# Patient Record
Sex: FEMALE | Race: BLACK OR AFRICAN AMERICAN | NOT HISPANIC OR LATINO | URBAN - METROPOLITAN AREA
[De-identification: names, ages, dates, MRNs, and addresses within clinical notes are randomized per-mention and may not be internally consistent; named-entity substitution may affect disease eponyms.]

---

## 2017-12-01 ENCOUNTER — EMERGENCY (EMERGENCY)
Facility: HOSPITAL | Age: 27
LOS: 1 days | Discharge: HOME | End: 2017-12-01
Attending: EMERGENCY MEDICINE | Admitting: EMERGENCY MEDICINE
Payer: COMMERCIAL

## 2017-12-01 VITALS
RESPIRATION RATE: 18 BRPM | HEART RATE: 109 BPM | OXYGEN SATURATION: 98 % | SYSTOLIC BLOOD PRESSURE: 115 MMHG | TEMPERATURE: 102 F | DIASTOLIC BLOOD PRESSURE: 71 MMHG

## 2017-12-01 VITALS
OXYGEN SATURATION: 98 % | RESPIRATION RATE: 18 BRPM | HEART RATE: 87 BPM | SYSTOLIC BLOOD PRESSURE: 105 MMHG | DIASTOLIC BLOOD PRESSURE: 60 MMHG | TEMPERATURE: 100 F

## 2017-12-01 DIAGNOSIS — J02.9 ACUTE PHARYNGITIS, UNSPECIFIED: ICD-10-CM

## 2017-12-01 DIAGNOSIS — J03.90 ACUTE TONSILLITIS, UNSPECIFIED: ICD-10-CM

## 2017-12-01 LAB — HCG UR QL: NEGATIVE — SIGNIFICANT CHANGE UP

## 2017-12-01 PROCEDURE — 96375 TX/PRO/DX INJ NEW DRUG ADDON: CPT

## 2017-12-01 PROCEDURE — 99284 EMERGENCY DEPT VISIT MOD MDM: CPT | Mod: 25

## 2017-12-01 PROCEDURE — 96374 THER/PROPH/DIAG INJ IV PUSH: CPT

## 2017-12-01 PROCEDURE — 81025 URINE PREGNANCY TEST: CPT

## 2017-12-01 PROCEDURE — 99284 EMERGENCY DEPT VISIT MOD MDM: CPT

## 2017-12-01 RX ORDER — DEXAMETHASONE 0.5 MG/5ML
10 ELIXIR ORAL ONCE
Qty: 0 | Refills: 0 | Status: COMPLETED | OUTPATIENT
Start: 2017-12-01 | End: 2017-12-01

## 2017-12-01 RX ORDER — SODIUM CHLORIDE 9 MG/ML
1000 INJECTION INTRAMUSCULAR; INTRAVENOUS; SUBCUTANEOUS ONCE
Qty: 0 | Refills: 0 | Status: COMPLETED | OUTPATIENT
Start: 2017-12-01 | End: 2017-12-01

## 2017-12-01 RX ORDER — ACETAMINOPHEN 500 MG
650 TABLET ORAL ONCE
Qty: 0 | Refills: 0 | Status: COMPLETED | OUTPATIENT
Start: 2017-12-01 | End: 2017-12-01

## 2017-12-01 RX ORDER — KETOROLAC TROMETHAMINE 30 MG/ML
30 SYRINGE (ML) INJECTION ONCE
Qty: 0 | Refills: 0 | Status: DISCONTINUED | OUTPATIENT
Start: 2017-12-01 | End: 2017-12-01

## 2017-12-01 RX ORDER — AMPICILLIN SODIUM AND SULBACTAM SODIUM 250; 125 MG/ML; MG/ML
3 INJECTION, POWDER, FOR SUSPENSION INTRAMUSCULAR; INTRAVENOUS ONCE
Qty: 0 | Refills: 0 | Status: COMPLETED | OUTPATIENT
Start: 2017-12-01 | End: 2017-12-01

## 2017-12-01 RX ORDER — AMOXICILLIN 250 MG/5ML
1 SUSPENSION, RECONSTITUTED, ORAL (ML) ORAL
Qty: 20 | Refills: 0 | OUTPATIENT
Start: 2017-12-01 | End: 2017-12-11

## 2017-12-01 RX ADMIN — Medication 650 MILLIGRAM(S): at 15:44

## 2017-12-01 RX ADMIN — Medication 10 MILLIGRAM(S): at 17:00

## 2017-12-01 RX ADMIN — Medication 30 MILLIGRAM(S): at 17:00

## 2017-12-01 RX ADMIN — SODIUM CHLORIDE 2000 MILLILITER(S): 9 INJECTION INTRAMUSCULAR; INTRAVENOUS; SUBCUTANEOUS at 16:26

## 2017-12-01 RX ADMIN — AMPICILLIN SODIUM AND SULBACTAM SODIUM 200 GRAM(S): 250; 125 INJECTION, POWDER, FOR SUSPENSION INTRAMUSCULAR; INTRAVENOUS at 16:26

## 2017-12-01 NOTE — ED ADULT NURSE NOTE - OBJECTIVE STATEMENT
states sore throat x3days. denies fever/chills but found to be febrile in triage.  denies chest pain, sob, dizziness.  no n/v/d.  denies body aches. upgraded to MD for fever.

## 2017-12-01 NOTE — ED PROVIDER NOTE - ENMT, MLM
Airway patent, Nasal mucosa clear. Mouth with normal mucosa. Throat has no vesicles, left tonsilar erythematous and edematous, with exudates

## 2017-12-01 NOTE — ED PROVIDER NOTE - ATTENDING CONTRIBUTION TO CARE
Pt w/ no sig PMHx p/w 3 days of L sided throat pain, progressively worsening. Pt reports dysphagia and odynophagia. No fever noted at home. No rhinorrhea. No cough. No SOB. Pt w/ no sig PMHx p/w 3 days of L sided throat pain, progressively worsening. Pt reports dysphagia and odynophagia. No fever noted at home. No rhinorrhea. No cough. No SOB. Pt is sexually active. No  complaints. Pt has IUD and states she does not get regular menses.   VITAL SIGNS: I have reviewed nursing notes and confirm.  CONSTITUTIONAL: Well-developed; well-nourished; in no acute distress. non- toxic appearing  SKIN: warm and dry, no acute rash.  HEAD: Normocephalic; atraumatic.  EYES: conjunctiva and sclera clear.  ENT: No nasal discharge; airway clear. uvula is midline. + L tonsil erythematous, enlarged w/ exudates. no peritonsillar swelling / bulging. no sublingual edema. no drooling or stridor.   NECK: Supple; + tender lymphadenopathy  CARD: S1, S2 normal; no murmurs, gallops, or rubs. Regular rate and rhythm.  RESP: Breaths sounds equal and clear b/l. No increased WOB, tachypnea, hypoxia, or accessory mm use. Pt speaks in full sentences.   EXT: Normal ROM.   LYMPH: + cervical adenopathy.  NEURO: Alert, oriented. Grossly unremarkable.  Pt p/w fever, exudates, adenopathy, c/w pharyngitis/ tonsillitis. IVF, IV Decadron for inflammation. IVF Toradol for pain / fever. Re-eval. Anticipate d/c w/ abx if clinically improved. No evidence of PTA On exam at this time, however will give pt careful return precautions, f/u PCP  PSYCH: Cooperative, appropriate.

## 2017-12-01 NOTE — ED PROVIDER NOTE - MEDICAL DECISION MAKING DETAILS
26 yo female here for throat pain, fever, with findings consistent with tonsilitis, no sign of abscess, no airway compromise, will give unasyn, decadron to reduce swelling, toradol and fluid for fever. 28 yo female here for throat pain, fever, with findings consistent with tonsilitis, no sign of abscess, no airway compromise, Patient given Unasyn, Decracron, IV toradol and IV hydration. Her symptoms improved. She is stable to be discharged home with 10 days of amoxicillin to follow up with primary care physician, which has been set up for her.

## 2017-12-01 NOTE — ED PROVIDER NOTE - OBJECTIVE STATEMENT
28 yo female no PMH who presented with 3 days of throat pain, stated pain started suddenly in AM 3 days prior, worsening, left side on back of her throat, worsened with swallowing, also been spitting up more. Denied chill, no runny nose, no SOB, no chest pain, no dysuria, some nausea without vomiting. No other complaints.

## 2017-12-04 ENCOUNTER — APPOINTMENT (OUTPATIENT)
Dept: INTERNAL MEDICINE | Facility: CLINIC | Age: 27
End: 2017-12-04
Payer: MEDICAID

## 2017-12-04 VITALS
DIASTOLIC BLOOD PRESSURE: 72 MMHG | TEMPERATURE: 98.9 F | HEART RATE: 81 BPM | BODY MASS INDEX: 30.82 KG/M2 | WEIGHT: 185 LBS | SYSTOLIC BLOOD PRESSURE: 109 MMHG | HEIGHT: 65 IN | OXYGEN SATURATION: 98 %

## 2017-12-04 DIAGNOSIS — Z00.00 ENCOUNTER FOR GENERAL ADULT MEDICAL EXAMINATION W/OUT ABNORMAL FINDINGS: ICD-10-CM

## 2017-12-04 DIAGNOSIS — J03.90 ACUTE TONSILLITIS, UNSPECIFIED: ICD-10-CM

## 2017-12-04 DIAGNOSIS — J06.9 ACUTE UPPER RESPIRATORY INFECTION, UNSPECIFIED: ICD-10-CM

## 2017-12-04 DIAGNOSIS — Z78.9 OTHER SPECIFIED HEALTH STATUS: ICD-10-CM

## 2017-12-04 DIAGNOSIS — B97.89 ACUTE UPPER RESPIRATORY INFECTION, UNSPECIFIED: ICD-10-CM

## 2017-12-04 PROCEDURE — 81003 URINALYSIS AUTO W/O SCOPE: CPT | Mod: QW

## 2017-12-04 PROCEDURE — 99214 OFFICE O/P EST MOD 30 MIN: CPT | Mod: 25,GC

## 2017-12-04 RX ORDER — ACETAMINOPHEN 500 MG/1
500 TABLET ORAL
Refills: 0 | Status: ACTIVE | COMMUNITY
Start: 2017-12-04

## 2017-12-04 RX ORDER — AMOXICILLIN 500 MG/1
500 TABLET, FILM COATED ORAL
Qty: 20 | Refills: 0 | Status: ACTIVE | COMMUNITY
Start: 2017-12-02

## 2017-12-04 RX ORDER — PREDNISONE 20 MG/1
20 TABLET ORAL DAILY
Qty: 10 | Refills: 0 | Status: ACTIVE | COMMUNITY
Start: 2017-12-04 | End: 1900-01-01

## 2017-12-11 LAB
C TRACH RRNA SPEC QL NAA+PROBE: NOT DETECTED
N GONORRHOEA RRNA SPEC QL NAA+PROBE: NOT DETECTED
SOURCE AMPLIFICATION: NORMAL

## 2018-01-29 ENCOUNTER — APPOINTMENT (OUTPATIENT)
Dept: INTERNAL MEDICINE | Facility: CLINIC | Age: 28
End: 2018-01-29

## 2018-07-23 PROBLEM — Z78.9 ALCOHOL USE: Status: ACTIVE | Noted: 2017-12-04

## 2019-10-09 ENCOUNTER — EMERGENCY (EMERGENCY)
Facility: HOSPITAL | Age: 29
LOS: 1 days | Discharge: HOME | End: 2019-10-09
Attending: EMERGENCY MEDICINE | Admitting: EMERGENCY MEDICINE
Payer: COMMERCIAL

## 2019-10-09 VITALS
DIASTOLIC BLOOD PRESSURE: 92 MMHG | SYSTOLIC BLOOD PRESSURE: 134 MMHG | WEIGHT: 194.01 LBS | OXYGEN SATURATION: 98 % | HEIGHT: 63 IN | RESPIRATION RATE: 18 BRPM | TEMPERATURE: 99 F | HEART RATE: 69 BPM

## 2019-10-09 LAB
APPEARANCE UR: ABNORMAL
BILIRUB UR-MCNC: NEGATIVE — SIGNIFICANT CHANGE UP
COLOR SPEC: YELLOW — SIGNIFICANT CHANGE UP
DIFF PNL FLD: ABNORMAL
GLUCOSE UR QL: NEGATIVE — SIGNIFICANT CHANGE UP
HCG UR QL: NEGATIVE — SIGNIFICANT CHANGE UP
KETONES UR-MCNC: NEGATIVE — SIGNIFICANT CHANGE UP
LEUKOCYTE ESTERASE UR-ACNC: ABNORMAL
NITRITE UR-MCNC: NEGATIVE — SIGNIFICANT CHANGE UP
PH UR: 6.5 — SIGNIFICANT CHANGE UP (ref 5–8)
PROT UR-MCNC: NEGATIVE MG/DL — SIGNIFICANT CHANGE UP
SP GR SPEC: 1.02 — SIGNIFICANT CHANGE UP (ref 1–1.03)
UROBILINOGEN FLD QL: 1 E.U./DL — SIGNIFICANT CHANGE UP

## 2019-10-09 PROCEDURE — 99283 EMERGENCY DEPT VISIT LOW MDM: CPT

## 2019-10-09 RX ORDER — IBUPROFEN 200 MG
600 TABLET ORAL ONCE
Refills: 0 | Status: COMPLETED | OUTPATIENT
Start: 2019-10-09 | End: 2019-10-09

## 2019-10-09 RX ORDER — VALACYCLOVIR 500 MG/1
1000 TABLET, FILM COATED ORAL ONCE
Refills: 0 | Status: COMPLETED | OUTPATIENT
Start: 2019-10-09 | End: 2019-10-09

## 2019-10-09 RX ORDER — VALACYCLOVIR 500 MG/1
1 TABLET, FILM COATED ORAL
Qty: 14 | Refills: 0
Start: 2019-10-09 | End: 2019-10-15

## 2019-10-09 NOTE — ED PROVIDER NOTE - NSFOLLOWUPINSTRUCTIONS_ED_ALL_ED_FT
Genital Herpes  Genital herpes is a common sexually transmitted infection (STI) that is caused by a virus. The virus spreads from person to person through sexual contact. Infection can cause itching, blisters, and sores around the genitals or rectum. Symptoms may last several days and then go away This is called an outbreak. However, the virus remains in your body, so you may have more outbreaks in the future. The time between outbreaks varies and can be months or years.  Genital herpes affects men and women. It is particularly concerning for pregnant women because the virus can be passed to the baby during delivery and can cause serious problems. Genital herpes is also a concern for people who have a weak disease-fighting (immune) system.  What are the causes?  This condition is caused by the herpes simplex virus (HSV) type 1 or type 2. The virus may spread through:  Sexual contact with an infected person, including vaginal, anal, and oral sex.Contact with fluid from a herpes sore.The skin. This means that you can get herpes from an infected partner even if he or she does not have a visible sore or does not know that he or she is infected.    What increases the risk?  You are more likely to develop this condition if:  You have sex with many partners.You do not use latex condoms during sex.    What are the signs or symptoms?  Most people do not have symptoms (asymptomatic) or have mild symptoms that may be mistaken for other skin problems. Symptoms may include:  Small red bumps near the genitals, rectum, or mouth. These bumps turn into blisters and then turn into sores.Flu-like symptoms, including:  Fever.Body aches.Swollen lymph nodes.Headache.Painful urination.Pain and itching in the genital area or rectal area.Vaginal discharge.Tingling or shooting pain in the legs and buttocks.Generally, symptoms are more severe and last longer during the first (primary) outbreak. Flu-like symptoms are also more common during the primary outbreak.    How is this diagnosed?  Genital herpes may be diagnosed based on:  A physical exam.Your medical history.Blood tests.A test of a fluid sample (culture) from an open sore.    How is this treated?  There is no cure for this condition, but treatment with antiviral medicines that are taken by mouth (orally) can do the following:  Speed up healing and relieve symptoms.Help to reduce the spread of the virus to sexual partners.Limit the chance of future outbreaks, or make future outbreaks shorter.Lessen symptoms of future outbreaks.Your health care provider may also recommend pain relief medicines, such as aspirin or ibuprofen.    Follow these instructions at home:  Sexual activity     Do not have sexual contact during active outbreaks.Practice safe sex. Latex condoms and female condoms may help prevent the spread of the herpes virus.General instructions     Keep the affected areas dry and clean.Take over-the-counter and prescription medicines only as told by your health care provider.Avoid rubbing or touching blisters and sores. If you do touch blisters or sores:  Wash your hands thoroughly with soap and water.Do not touch your eyes afterward.To help relieve pain or itching, you may take the following actions as directed by your health care provider:  Apply a cold, wet cloth (cold compress) to affected areas 4–6 times a day.Apply a substance that protects your skin and reduces bleeding (astringent).Apply a gel that helps relieve pain around sores (lidocaine gel).Take a warm, shallow bath that cleans the genital area (sitz bath).Keep all follow-up visits as told by your health care provider. This is important.    How is this prevented?  Use condoms. Although anyone can get genital herpes during sexual contact, even with the use of a condom, a condom can provide some protection.Avoid having multiple sexual partners.Talk with your sexual partner about any symptoms either of you may have. Also, talk with your partner about any history of STIs.Get tested for STIs before you have sex. Ask your partner to do the same.Do not have sexual contact if you have symptoms of genital herpes.Contact a health care provider if:  Your symptoms are not improving with medicine.Your symptoms return.You have new symptoms.You have a fever.You have abdominal pain.You have redness, swelling, or pain in your eye.You notice new sores on other parts of your body.You are a woman and experience bleeding between menstrual periods.You have had herpes and you become pregnant or plan to become pregnant.Summary  Genital herpes is a common sexually transmitted infection (STI) that is caused by the herpes simplex virus (HSV) type 1 or type 2.These viruses are most often spread through sexual contact with an infected person.You are more likely to develop this condition if you have sex with many partners or you have unprotected sex.Most people do not have symptoms (asymptomatic) or have mild symptoms that may be mistaken for other skin problems. Symptoms occur as outbreaks that may happen months or years apart.There is no cure for this condition, but treatment with oral antiviral medicines can reduce symptoms, reduce the chance of spreading the virus to a partner, prevent future outbreaks, or shorten future outbreaks.This information is not intended to replace advice given to you by your health care provider. Make sure you discuss any questions you have with your health care provider.

## 2019-10-09 NOTE — ED PROVIDER NOTE - PROGRESS NOTE DETAILS
reiterated importance of safe sex practices and to avoid contact while with active lesion.  recommend f/u with GYN  I have discussed the discharge plan with the patient. The patient agrees with the plan, as discussed.  The patient understands Emergency Department diagnosis is a preliminary diagnosis often based on limited information and that the patient must adhere to the follow-up plan as discussed.  The patient understands that if the symptoms worsen or if prescribed medications do not have the desired/planned effect that the patient may return to the Emergency Department at any time for further evaluation and treatment.

## 2019-10-09 NOTE — ED PROVIDER NOTE - PATIENT PORTAL LINK FT
You can access the FollowMyHealth Patient Portal offered by [facility] by registering at the following website: [email or web address]. By joining [employer]’s FollowMyHealth portal, you will also be able to view your health information using other applications (apps) compatible with our system.

## 2019-10-09 NOTE — ED PROVIDER NOTE - OBJECTIVE STATEMENT
29F no PMH c/o vaginal lesion. states developed burning with urination and noted bump 3 days ago.  no discharge. no vomiting. no fevers.  states uses condoms.

## 2019-10-10 VITALS
RESPIRATION RATE: 16 BRPM | HEART RATE: 71 BPM | DIASTOLIC BLOOD PRESSURE: 74 MMHG | OXYGEN SATURATION: 98 % | TEMPERATURE: 98 F | SYSTOLIC BLOOD PRESSURE: 124 MMHG

## 2019-10-10 PROCEDURE — 87086 URINE CULTURE/COLONY COUNT: CPT

## 2019-10-10 PROCEDURE — 87255 GENET VIRUS ISOLATE HSV: CPT

## 2019-10-10 PROCEDURE — 87591 N.GONORRHOEAE DNA AMP PROB: CPT

## 2019-10-10 PROCEDURE — 87140 CULTURE TYPE IMMUNOFLUORESC: CPT

## 2019-10-10 PROCEDURE — 81025 URINE PREGNANCY TEST: CPT

## 2019-10-10 PROCEDURE — 87491 CHLMYD TRACH DNA AMP PROBE: CPT

## 2019-10-10 PROCEDURE — 99283 EMERGENCY DEPT VISIT LOW MDM: CPT

## 2019-10-10 PROCEDURE — 81001 URINALYSIS AUTO W/SCOPE: CPT

## 2019-10-10 RX ADMIN — VALACYCLOVIR 1000 MILLIGRAM(S): 500 TABLET, FILM COATED ORAL at 00:19

## 2019-10-10 RX ADMIN — Medication 600 MILLIGRAM(S): at 00:18

## 2019-10-11 LAB
C TRACH RRNA SPEC QL NAA+PROBE: SIGNIFICANT CHANGE UP
CULTURE RESULTS: SIGNIFICANT CHANGE UP
N GONORRHOEA RRNA SPEC QL NAA+PROBE: SIGNIFICANT CHANGE UP
SPECIMEN SOURCE: SIGNIFICANT CHANGE UP
SPECIMEN SOURCE: SIGNIFICANT CHANGE UP

## 2019-10-13 LAB — HHV SPEC CULT: ABNORMAL

## 2019-10-22 DIAGNOSIS — Z79.2 LONG TERM (CURRENT) USE OF ANTIBIOTICS: ICD-10-CM

## 2019-10-22 DIAGNOSIS — Z79.899 OTHER LONG TERM (CURRENT) DRUG THERAPY: ICD-10-CM

## 2019-10-22 DIAGNOSIS — R21 RASH AND OTHER NONSPECIFIC SKIN ERUPTION: ICD-10-CM

## 2019-10-22 DIAGNOSIS — R30.0 DYSURIA: ICD-10-CM

## 2019-10-22 DIAGNOSIS — N89.8 OTHER SPECIFIED NONINFLAMMATORY DISORDERS OF VAGINA: ICD-10-CM

## 2020-12-15 PROBLEM — J06.9 VIRAL URI: Status: RESOLVED | Noted: 2017-12-04 | Resolved: 2020-12-15

## 2022-05-20 NOTE — ED PROVIDER NOTE - PMH
Medication faxed.
Pharmacy accidentally cancelled this Rx and needs a new one sent over. They state the pt is aware of their error and that we just need to resend the Rx. Rx pended.
No pertinent past medical history

## 2022-11-07 NOTE — ED ADULT NURSE NOTE - FALL HARM RISK TYPE OF ASSESSMENT
Daily Assessment High Dose Vitamin A Counseling: Side effects reviewed, pt to contact office should one occur.